# Patient Record
Sex: FEMALE | Race: BLACK OR AFRICAN AMERICAN
[De-identification: names, ages, dates, MRNs, and addresses within clinical notes are randomized per-mention and may not be internally consistent; named-entity substitution may affect disease eponyms.]

---
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
## 2020-03-02 ENCOUNTER — HOSPITAL ENCOUNTER (EMERGENCY)
Dept: HOSPITAL 46 - ED | Age: 20
Discharge: HOME | End: 2020-03-02
Payer: COMMERCIAL

## 2020-03-02 VITALS — WEIGHT: 125 LBS | BODY MASS INDEX: 19.62 KG/M2 | HEIGHT: 67 IN

## 2020-03-02 DIAGNOSIS — G43.909: ICD-10-CM

## 2020-03-02 DIAGNOSIS — J45.909: ICD-10-CM

## 2020-03-02 DIAGNOSIS — Z87.891: ICD-10-CM

## 2020-03-02 DIAGNOSIS — J06.9: Primary | ICD-10-CM

## 2020-03-02 NOTE — XMS
PreManage Notification: IVONE GRAY MRN:[id number]
 
Security Information
 
Security Events
No recent Security Events currently on file
 
 
 
CRITERIA MET
------------
- [facility] - 3 Facilities in 90 Days
- [facility] - 2 Visits in 30 Days
 
 
CARE PROVIDERS
-------------------------------------------------------------------------------------
[facility] [place] [facility]     Current
OR
 
PHONE: Unknown
-------------------------------------------------------------------------------------
EMMANUELLE REAVES     Primary Care     Current
 
PHONE: Unknown
-------------------------------------------------------------------------------------
[facility]           Primary Care     Current
BUSINESS SERVICES
 
PHONE: Unknown
-------------------------------------------------------------------------------------
 
Maria Fernanda has no Care Guidelines for this patient.
 
E.D. VISIT COUNT (12 MO.)
-------------------------------------------------------------------------------------
1 [facility]
 
2 [facility]
 
2 [facility]
 
2 [facility]
 
1 [facility]
-------------------------------------------------------------------------------------
TOTAL 8
-------------------------------------------------------------------------------------
NOTE: Visits indicate total known visits.
 
ED/UCC VISIT TRACKING (12 MO.)
-------------------------------------------------------------------------------------
03/02/2020 12:40
[facility] [place]
 
TYPE: Emergency
 
COMPLAINT:
- FLU SYMPTOMS
 
-------------------------------------------------------------------------------------
02[facility]/2020 18[facility]
 
[facility]
 
DIAGNOSES:
- Heart Palpitations
- Chest Pain
- CP;V;N:D
- Shortness of Breath
- Ileus, unspecified
- Emesis
-------------------------------------------------------------------------------------
02/02/2020 00:49
[facility]
 
TYPE: Emergency
 
DIAGNOSES:
0. CHEST PAIN HX OF HEART CONDITION
-------------------------------------------------------------------------------------
01/31/2020 12:17
[facility] [place]
 
TYPE: Emergency
 
DIAGNOSES:
- Conversion disorder with seizures or convulsions
- Chest pain, unspecified
- Paroxysmal atrial fibrillation
- Syncope and collapse
- Unspecified injury of head, initial encounter
- syncope
-------------------------------------------------------------------------------------
12/18/2019 14:16
[facility]
 
TYPE: Emergency
 
DIAGNOSES:
0. SEIZURES ON AND OFF ALL DAY
-------------------------------------------------------------------------------------
07/23/2019 15:20
[facility]       [place]
 
TYPE: Emergency
 
DIAGNOSES:
- Conversion disorder with seizures or convulsions
- Seizure (Adult - Re-evaluation)
- Transient alteration of awareness
-------------------------------------------------------------------------------------
07/21/2019 12:03
[facility]       [place] [facility]
 
TYPE: Emergency
 
 
DIAGNOSES:
- Paroxysmal atrial fibrillation
- Alcohol use, unspecified with intoxication, uncomplicated
- Seizure (Adult - Re-evaluation)
- Unspecified convulsions
-------------------------------------------------------------------------------------
07/21/2019 02:43
[facility]
 
[facility]: [facility]
 
DIAGNOSES:
- Possible Seizure
- Epilepsy, unsp, not intractable, with status epilepticus
- Unspecified convulsions
- Altered Mental Status
-------------------------------------------------------------------------------------
 
 
INPATIENT VISIT TRACKING (12 MO.)
No inpatient visits to display in this time frame
 
[email or web address]

## 2020-03-04 ENCOUNTER — HOSPITAL ENCOUNTER (EMERGENCY)
Dept: HOSPITAL 46 - ED | Age: 20
Discharge: HOME | End: 2020-03-04
Payer: COMMERCIAL

## 2020-03-04 VITALS — BODY MASS INDEX: 19.62 KG/M2 | WEIGHT: 125 LBS | HEIGHT: 67 IN

## 2020-03-04 DIAGNOSIS — G89.29: ICD-10-CM

## 2020-03-04 DIAGNOSIS — F17.200: ICD-10-CM

## 2020-03-04 DIAGNOSIS — R10.2: ICD-10-CM

## 2020-03-04 DIAGNOSIS — Z30.432: Primary | ICD-10-CM

## 2020-03-04 NOTE — XMS
PreManage Notification: IVONE GRAY MRN:[id number]
 
Security Information
 
Security Events
No recent Security Events currently on file
 
 
 
CRITERIA MET
------------
- Group Notification
- 6 ED Visits in 6 Months
 
 
CARE PROVIDERS
-------------------------------------------------------------------------------------
[facility]           Primary Care     [facility]
 
PHONE: Unknown
-------------------------------------------------------------------------------------
EMMANUELLE REAVES     Primary Care     Current
 
PHONE: Unknown
-------------------------------------------------------------------------------------
[facility]           Primary Care     Current
[employer]INESS [employer]
 
PHONE: Unknown
-------------------------------------------------------------------------------------
 
Maria Fernanda has no Care Guidelines for this patient.
 
EMATTHEW VISIT COUNT (12 MO.)
-------------------------------------------------------------------------------------
1 [facility]
 
[facility]
 
[facility]
 
[facility]
 
2 [facility]
-------------------------------------------------------------------------------------
TOTAL 9
-------------------------------------------------------------------------------------
NOTE: Visits indicate total known visits.
 
ED/UCC VISIT TRACKING (12 MO.)
-------------------------------------------------------------------------------------
03/04/2020 17:29
[facility] [place]
 
TYPE: Emergency
 
COMPLAINT:
- ABDOMINAL PAIN
 
-------------------------------------------------------------------------------------
03/02/2020 12:40
[facility] [place]
 
TYPE: Emergency
 
COMPLAINT:
- FLU SYMPTOMS
-------------------------------------------------------------------------------------
02/20/2020 18:13
[facility]
 
TYPE: Emergency
 
DIAGNOSES:
- Heart Palpitations
- Chest Pain
- CP;V;N:D
- Shortness of Breath
- Ileus, unspecified
- Emesis
-------------------------------------------------------------------------------------
02/02/2020 00:49
[facility]
 
TYPE: Emergency
 
DIAGNOSES:
0. CHEST PAIN HX OF HEART CONDITION
-------------------------------------------------------------------------------------
01/31/2020 12:17
[facility]
 
TYPE: Emergency
 
DIAGNOSES:
- Conversion disorder with seizures or convulsions
- Chest pain, unspecified
- Paroxysmal atrial fibrillation
- Syncope and collapse
- Unspecified injury of head, initial encounter
- syncope
-------------------------------------------------------------------------------------
12/18/2019 14:16
[facility]
 
TYPE: Emergency
 
DIAGNOSES:
0. SEIZURES ON AND OFF ALL DAY
-------------------------------------------------------------------------------------
07/23/2019 15:20
[facility]
 
TYPE: Emergency
 
DIAGNOSES:
- Conversion disorder with seizures or convulsions
- Seizure (Adult - Re-evaluation)
 
- Transient alteration of awareness
-------------------------------------------------------------------------------------
07/21/2019 12:03
[facility]
 
TYPE: Emergency
 
DIAGNOSES:
- Paroxysmal atrial fibrillation
- Alcohol use, unspecified with intoxication, uncomplicated
- Seizure (Adult - Re-evaluation)
- Unspecified convulsions
-------------------------------------------------------------------------------------
07/21/2019 02:43
[facility]
 
TYPE: [facility]
 
DIAGNOSES:
- Possible Seizure
- Epilepsy, unsp, not intractable, with status epilepticus
- Unspecified convulsions
- Altered Mental Status
-------------------------------------------------------------------------------------
 
 
INPATIENT VISIT TRACKING (12 MO.)
No inpatient visits to display in this time frame
 
[email or web address]

## 2020-04-13 ENCOUNTER — HOSPITAL ENCOUNTER (EMERGENCY)
Dept: HOSPITAL 46 - ED | Age: 20
Discharge: HOME | End: 2020-04-13
Payer: COMMERCIAL

## 2020-04-13 VITALS — BODY MASS INDEX: 19.7 KG/M2 | HEIGHT: 68 IN | WEIGHT: 130.01 LBS

## 2020-04-13 DIAGNOSIS — Z87.891: ICD-10-CM

## 2020-04-13 DIAGNOSIS — G43.909: ICD-10-CM

## 2020-04-13 DIAGNOSIS — J45.909: ICD-10-CM

## 2020-04-13 DIAGNOSIS — Z32.01: Primary | ICD-10-CM

## 2021-02-04 ENCOUNTER — HOSPITAL ENCOUNTER (EMERGENCY)
Dept: HOSPITAL 46 - ED | Age: 21
LOS: 1 days | Discharge: HOME | End: 2021-02-05
Payer: COMMERCIAL

## 2021-02-04 VITALS — BODY MASS INDEX: 19.7 KG/M2 | WEIGHT: 130 LBS | HEIGHT: 68 IN

## 2021-02-04 DIAGNOSIS — F10.129: Primary | ICD-10-CM

## 2021-02-04 DIAGNOSIS — Z87.891: ICD-10-CM

## 2021-02-04 DIAGNOSIS — J45.909: ICD-10-CM

## 2021-02-04 DIAGNOSIS — R45.851: ICD-10-CM

## 2021-02-04 DIAGNOSIS — G43.909: ICD-10-CM
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
## 2021-02-04 NOTE — XMS
PreManage Notification: IVONE GRAY MRN:[id number]
 
Security Information
 
Security Events
No recent Security Events currently on file
 
 
 
CRITERIA MET
------------
- Group Notification
- [facility] - 2 Visits in 30 Days
 
 
CARE PROVIDERS
There are no care providers on record at this time.
 
Maria Fernanda has no Care Guidelines for this patient.
Care History
Medical/Surgical
03/05/2020    [facility]
 
      - [facility] CALLED PATIENT 2X LEFT A VOICEMAIL. 
      - PATIENT DOES NOT HAVE A PCP LISTED FOR FOLLOW UP TO RECENT ED VISITS. 
      - PLEASE REFER PATIENT TO THE WALK IN CLINIC FOR NON EMERGENT MEDICAL NEEDS. 
      - NO PCP LETTER SENT TO PATIENT.
[facility] VISIT COUNT (12 MO.)
-------------------------------------------------------------------------------------
1 [facility]
 
1 [facility]
 
4 [facility]
-------------------------------------------------------------------------------------
TOTAL 6
-------------------------------------------------------------------------------------
NOTE: Visits indicate total known visits.
 
ED/UCC VISIT TRACKING (12 MO.)
-------------------------------------------------------------------------------------
02/04/2021 22:28
[facility] [place]
 
TYPE: Emergency
 
COMPLAINT:
- INTOXICATION
-------------------------------------------------------------------------------------
01/15/2021 09:56
[facility]
 
TYPE: Emergency
 
DIAGNOSES:
0. PAIN TO STITCH PLACED AFTER BIRTH 12/15
-------------------------------------------------------------------------------------
04/13/2020 18:01
[facility] [place] [facility]
 
 
TYPE: Emergency
 
COMPLAINT:
- FLANK PAIN
 
DIAGNOSES:
- Encounter for pregnancy test, result positive
- Unspecified asthma, uncomplicated
- Personal history of nicotine dependence
- Migraine, unspecified, not intractable, without status migrainosus
-------------------------------------------------------------------------------------
03/04/2020 17:29
[facility] [place]
 
TYPE: Emergency
 
COMPLAINT:
- ABDOMINAL PAIN
 
DIAGNOSES:
- Pelvic and perineal pain
- Encounter for removal of intrauterine contraceptive device
- Other chronic pain
- Nicotine dependence, unspecified, uncomplicated
- Abnormal uterine and vaginal bleeding, unspecified
-------------------------------------------------------------------------------------
03/02/2020 12:40
[facility] [place]
 
TYPE: Emergency
 
COMPLAINT:
- FLU SYMPTOMS
 
DIAGNOSES:
- Cough
- Acute upper respiratory infection, unspecified
- Unspecified asthma, uncomplicated
- Personal history of nicotine dependence
- Migraine, unspecified, not intractable, without status migrainosus
-------------------------------------------------------------------------------------
02/20/2020 18:13
[facility]
 
[facility]
 
DIAGNOSES:
- Heart Palpitations
- Chest Pain
- CP;V;N:D
- Shortness of Breath
- Ileus, unspecified
- Emesis
-------------------------------------------------------------------------------------
 
 
INPATIENT VISIT TRACKING (12 MO.)
-------------------------------------------------------------------------------------
12/14/2020 05:46
 
[facility]     [place] [facility]
 
TYPE: Mother Baby Unit
 
DIAGNOSES:
- Encounter for routine postpartum follow-up
-------------------------------------------------------------------------------------
 
[email or web address]

## 2021-02-05 NOTE — EKG
[facility]
                                    [address]
                                  [place]  [address]
_________________________________________________________________________________________
                                                                 Signed   
 
 
Normal sinus rhythm
T wave abnormality, consider lateral ischemia
Abnormal ECG
When compared with ECG of 19-SEP-2017 14:54,
Vent. rate has increased BY  34 BPM
Inverted T waves have replaced nonspecific T wave abnormality in Lateral leads
Confirmed by AUGUSTIN DENIS MD ([id number]) on 2/5/2021 12:46:44 PM
 
 
 
 
 
 
 
 
 
 
 
 
 
 
 
 
 
 
 
 
 
 
 
 
 
 
 
 
 
 
 
 
 
 
 
 
 
 
    Electronically Signed By: AUGUSTIN DENIS MD  02/05/21 1246
_________________________________________________________________________________________
PATIENT NAME:     IVONE GRAY            
MEDICAL RECORD #: [id number]                     Electrocardiogram             
          ACCT #: [id number]  
DATE OF BIRTH:   [birth year]                                       
PHYSICIAN:   AUGUSTIN DENIS MD                   REPORT #: [id number]
REPORT IS CONFIDENTIAL AND NOT TO BE RELEASED WITHOUT AUTHORIZATION

## 2021-02-21 ENCOUNTER — HOSPITAL ENCOUNTER (EMERGENCY)
Dept: HOSPITAL 46 - ED | Age: 21
Discharge: HOME | End: 2021-02-21
Payer: COMMERCIAL

## 2021-02-21 VITALS — HEIGHT: 68 IN | WEIGHT: 130 LBS | BODY MASS INDEX: 19.7 KG/M2
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
## 2021-02-21 DIAGNOSIS — S51.812A: Primary | ICD-10-CM

## 2021-02-21 DIAGNOSIS — Z87.891: ICD-10-CM

## 2021-02-21 PROCEDURE — 0HQEXZZ REPAIR LEFT LOWER ARM SKIN, EXTERNAL APPROACH: ICD-10-PCS

## 2021-02-21 NOTE — XMS
PreManage Notification: IVONE GRAY MRN:[id number]
 
Security Information
 
Security Events
No recent Security Events currently on file
 
 
 
CRITERIA MET
------------
- Group Notification
- [facility] - 2 Visits in 30 Days
 
 
CARE PROVIDERS
There are no care providers on record at this time.
 
Maria Fernanda has no Care Guidelines for this patient.
Care History
Medical/Surgical
03/05/2020    [facility]
 
      - [facility] CALLED PATIENT 2X LEFT A VOICEMAIL. 
      - PATIENT DOES NOT HAVE A PCP LISTED FOR FOLLOW UP TO RECENT ED VISITS. 
      - PLEASE REFER PATIENT TO THE WALK IN CLINIC FOR NON EMERGENT MEDICAL NEEDS. 
      - NO PCP LETTER SENT TO PATIENT.
[facility] VISIT COUNT (12 MO.)
-------------------------------------------------------------------------------------
[facility]
 
5 [facility]
-------------------------------------------------------------------------------------
TOTAL 6
-------------------------------------------------------------------------------------
NOTE: Visits indicate total known visits.
 
ED/UCC VISIT TRACKING (12 MO.)
-------------------------------------------------------------------------------------
02/21/2021 01:15
[facility] [place] [facility] [place]
 
[place]
 
COMPLAINT:
- SUICIDAL
-------------------------------------------------------------------------------------
02/04/2021 22:28
[facility] [place] [facility] [place]
 
TYPE: Emergency
 
COMPLAINT:
- INTOXICATION
 
DIAGNOSES:
- Migraine, unspecified, not intractable, without status migrainosus
- Unspecified asthma, uncomplicated
- Blood alcohol level of 200-239 mg/100 ml
 
- Alcohol abuse with intoxication, unspecified
- Personal history of nicotine dependence
- Alcohol abuse with intoxication, unspecified
- Suicidal ideations
-------------------------------------------------------------------------------------
01/15/2021 09:56
[facility]
 
TYPE: Emergency
 
DIAGNOSES:
0. PAIN TO STITCH PLACED AFTER BIRTH 12/15
-------------------------------------------------------------------------------------
04/13/2020 18:01
[facility] [place]
 
TYPE: Emergency
 
COMPLAINT:
- FLANK PAIN
 
DIAGNOSES:
- Encounter for pregnancy test, result positive
- Unspecified asthma, uncomplicated
- Personal history of nicotine dependence
- Migraine, unspecified, not intractable, without status migrainosus
-------------------------------------------------------------------------------------
03/04/2020 17:29
[facility] [place]
 
TYPE: Emergency
 
COMPLAINT:
- ABDOMINAL PAIN
 
DIAGNOSES:
- Pelvic and perineal pain
- Encounter for removal of intrauterine contraceptive device
- Other chronic pain
- Nicotine dependence, unspecified, uncomplicated
- Abnormal uterine and vaginal bleeding, unspecified
-------------------------------------------------------------------------------------
03/02/2020 12:40
[facility] [place]
 
TYPE: Emergency
 
COMPLAINT:
- FLU SYMPTOMS
 
DIAGNOSES:
- Cough
- Acute upper respiratory infection, unspecified
- Unspecified asthma, uncomplicated
- Personal history of nicotine dependence
- Migraine, unspecified, not intractable, without status migrainosus
-------------------------------------------------------------------------------------
 
 
INPATIENT VISIT TRACKING (12 MO.)
 
-------------------------------------------------------------------------------------
12/14/2020 05:46
[facility] [facility]
 
TYPE: Mother Baby Unit
 
DIAGNOSES:
- Encounter for routine postpartum follow-up
-------------------------------------------------------------------------------------
 
[email or web address]

## 2021-09-11 ENCOUNTER — HOSPITAL ENCOUNTER (EMERGENCY)
Dept: HOSPITAL 46 - ED | Age: 21
Discharge: HOME | End: 2021-09-11
Payer: COMMERCIAL

## 2021-09-11 VITALS — BODY MASS INDEX: 19.7 KG/M2 | WEIGHT: 130.01 LBS | HEIGHT: 68 IN

## 2021-09-11 DIAGNOSIS — F10.129: ICD-10-CM

## 2021-09-11 DIAGNOSIS — R56.9: Primary | ICD-10-CM

## 2021-09-11 DIAGNOSIS — J45.909: ICD-10-CM

## 2021-09-11 DIAGNOSIS — Z87.891: ICD-10-CM

## 2021-09-11 DIAGNOSIS — G43.909: ICD-10-CM

## 2021-09-11 PROCEDURE — G0480 DRUG TEST DEF 1-7 CLASSES: HCPCS

## 2021-09-11 NOTE — XMS
PreManage Notification: IVONE GRAY MRN:[id number]
 
Security Information
 
Security Events
No recent Security Events currently on file
 
 
 
CRITERIA MET
------------
- Group Notification
 
 
CARE PROVIDERS
There are no care providers on record at this time.
 
Maria Fernanda has no Care Guidelines for this patient.
Care History
Medical/Surgical
03/05/2020    [facility]
 
      - CHW CALLED PATIENT 2X LEFT A VOICEMAIL. 
      - PATIENT DOES NOT HAVE A PCP LISTED FOR FOLLOW UP TO RECENT ED VISITS. 
      - PLEASE REFER PATIENT TO THE WALK IN CLINIC FOR NON EMERGENT MEDICAL NEEDS. 
      - NO PCP LETTER SENT TO PATIENT.
EMATTHEW VISIT COUNT (12 MO.)
-------------------------------------------------------------------------------------
[facility]
-------------------------------------------------------------------------------------
TOTAL 4
-------------------------------------------------------------------------------------
NOTE: Visits indicate total known visits.
 
ED/UCC VISIT TRACKING (12 MO.)
-------------------------------------------------------------------------------------
09/11/2021 14:54
[facility] [place] [facility] [place]
 
TYPE: Emergency
 
COMPLAINT:
- SEIZURES
-------------------------------------------------------------------------------------
02/21/2021 01:15
[facility]
 
TYPE: Emergency
 
COMPLAINT:
- SUICIDAL
 
DIAGNOSES:
- Personal history of nicotine dependence
- Laceration without foreign body of left forearm, initial encounter
- Intentional self-harm by unspecified sharp object, initial encounter
-------------------------------------------------------------------------------------
 
02/04/2021 22:28
[facility]     [place]
 
TYPE: Emergency
 
COMPLAINT:
- INTOXICATION
 
DIAGNOSES:
- Migraine, unspecified, not intractable, without status migrainosus
- Unspecified asthma, uncomplicated
- Blood alcohol level of 200-239 mg/100 ml
- Alcohol abuse with intoxication, unspecified
- Personal history of nicotine dependence
- Alcohol abuse with intoxication, unspecified
- Suicidal ideations
-------------------------------------------------------------------------------------
01/15/2021 09:56
[facility]
 
TYPE: Emergency
 
DIAGNOSES:
0. PAIN TO STITCH PLACED AFTER BIRTH 12/15
-------------------------------------------------------------------------------------
 
 
INPATIENT VISIT TRACKING (12 MO.)
-------------------------------------------------------------------------------------
12/14/2020 05:46
[facility]
 
TYPE: Mother Baby Unit
 
DIAGNOSES:
- Encounter for routine postpartum follow-up
-------------------------------------------------------------------------------------
 
[email or web address]

## 2021-09-11 NOTE — EKG
[facility]
                                    [address]
                                  [place]  [address]
_________________________________________________________________________________________
                                                                 Signed   
 
 
Sinus tachycardia
T wave abnormality, consider inferolateral ischemia
Abnormal ECG
When compared with ECG of 04-FEB-2021 22:46,
T wave inversion more evident in Inferior leads
Confirmed by SRINIVAS LOPEZ DO ([id number]) on 9/11/2021 8:06:32 PM
 
 
 
 
 
 
 
 
 
 
 
 
 
 
 
 
 
 
 
 
 
 
 
 
 
 
 
 
 
 
 
 
 
 
 
 
 
 
 
    Electronically Signed By: SRINIVAS LOPEZ DO  09/11/21 2006
_________________________________________________________________________________________
PATIENT NAME:     IVONE GRAY            
MEDICAL RECORD #: [id number]                     Electrocardiogram             
          ACCT #: [id number]  
DATE OF BIRTH:   [birth year]                                       
PHYSICIAN:   SRINIVAS LOPEZ DO                     REPORT #: [id number]
REPORT IS CONFIDENTIAL AND NOT TO BE RELEASED WITHOUT AUTHORIZATION

## 2021-09-18 ENCOUNTER — HOSPITAL ENCOUNTER (EMERGENCY)
Dept: HOSPITAL 46 - ED | Age: 21
Discharge: HOME | End: 2021-09-18
Payer: COMMERCIAL

## 2021-09-18 VITALS — HEIGHT: 68 IN | BODY MASS INDEX: 19.7 KG/M2 | WEIGHT: 130.01 LBS

## 2021-09-18 DIAGNOSIS — G43.909: ICD-10-CM

## 2021-09-18 DIAGNOSIS — J45.909: ICD-10-CM

## 2021-09-18 DIAGNOSIS — Z87.891: ICD-10-CM

## 2021-09-18 DIAGNOSIS — R56.9: Primary | ICD-10-CM

## 2021-09-18 PROCEDURE — G0480 DRUG TEST DEF 1-7 CLASSES: HCPCS

## 2021-09-18 NOTE — XMS
PreManage Notification: IVONE GRAY MRN:[id number]
 
Security Information
 
Security Events
1 event(s) in the past 18 months
Most recent security events:
 
Elopement at [facility] 09/13/2021 02:00
  -    Other
Details:
    PATIENT LWBS.
 
 
 
CRITERIA MET
------------
- [facility] - 2 Visits in 30 Days
- Group Notification
 
 
CARE PROVIDERS
There are no care providers on record at this time.
 
Maria Fernanda has no Care Guidelines for this patient.
Care History
Medical/Surgical
03/05/2020    [facility]
 
      - CHW CALLED PATIENT 2X LEFT A VOICEMAIL. 
      - PATIENT DOES NOT HAVE A PCP LISTED FOR FOLLOW UP TO RECENT ED VISITS. 
      - PLEASE REFER PATIENT TO THE WALK IN CLINIC FOR NON EMERGENT MEDICAL NEEDS. 
      - NO PCP LETTER SENT TO PATIENT.
ROEL VISIT COUNT (12 MO.)
-------------------------------------------------------------------------------------
[facility]
 
[address] [place] [address].
-------------------------------------------------------------------------------------
TOTAL 6
-------------------------------------------------------------------------------------
NOTE: Visits indicate total known visits.
 
ED/UCC VISIT TRACKING (12 MO.)
-------------------------------------------------------------------------------------
09/18/2021 01:38
[facility] [place] [facility]
 
TYPE: Emergency
 
COMPLAINT:
- SEZIURE
-------------------------------------------------------------------------------------
09/13/2021 02:00
[facility]
 
[facility]
 
COMPLAINT:
 
- SEIZURE
-------------------------------------------------------------------------------------
09/11/2021 14:54
[facility] [place] [facility] [place]
 
TYPE: Emergency
 
COMPLAINT:
- SEIZURES
 
DIAGNOSES:
- Alcohol abuse with intoxication, unspecified
- Migraine, unspecified, not intractable, without status migrainosus
- Unspecified asthma, uncomplicated
- Personal history of nicotine dependence
- Unspecified convulsions
-------------------------------------------------------------------------------------
02/21/2021 01:15
[facility] [place] [facility] [place]
 
TYPE: Emergency
 
COMPLAINT:
- SUICIDAL
 
DIAGNOSES:
- Personal history of nicotine dependence
- Laceration without foreign body of left forearm, initial encounter
- Intentional self-harm by unspecified sharp object, initial encounter
-------------------------------------------------------------------------------------
02/04/2021 22:28
[facility] [place]
 
TYPE: Emergency
 
COMPLAINT:
- INTOXICATION
 
DIAGNOSES:
- Migraine, unspecified, not intractable, without status migrainosus
- Unspecified asthma, uncomplicated
- Blood alcohol level of 200-239 mg/100 ml
- Alcohol abuse with intoxication, unspecified
- Personal history of nicotine dependence
- Alcohol abuse with intoxication, unspecified
- Suicidal ideations
-------------------------------------------------------------------------------------
01/15/2021 09:56
[facility]
 
TYPE: Emergency
 
DIAGNOSES:
0. PAIN TO STITCH PLACED AFTER BIRTH 12/15
-------------------------------------------------------------------------------------
 
 
INPATIENT VISIT TRACKING (12 MO.)
-------------------------------------------------------------------------------------
12/14/2020 05:46
 
[facility]
 
TYPE: Mother Baby Unit
 
DIAGNOSES:
- Encounter for routine postpartum follow-up
-------------------------------------------------------------------------------------
 
[email or web address]